# Patient Record
Sex: FEMALE | Race: WHITE | NOT HISPANIC OR LATINO | URBAN - METROPOLITAN AREA
[De-identification: names, ages, dates, MRNs, and addresses within clinical notes are randomized per-mention and may not be internally consistent; named-entity substitution may affect disease eponyms.]

---

## 2018-07-29 ENCOUNTER — EMERGENCY (EMERGENCY)
Facility: HOSPITAL | Age: 38
LOS: 1 days | Discharge: HOME | End: 2018-07-29
Attending: EMERGENCY MEDICINE | Admitting: EMERGENCY MEDICINE
Payer: MEDICAID

## 2018-07-29 VITALS
RESPIRATION RATE: 16 BRPM | SYSTOLIC BLOOD PRESSURE: 129 MMHG | HEART RATE: 108 BPM | WEIGHT: 134.92 LBS | OXYGEN SATURATION: 100 % | TEMPERATURE: 99 F | DIASTOLIC BLOOD PRESSURE: 79 MMHG
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
## 2018-07-29 DIAGNOSIS — S60.221A CONTUSION OF RIGHT HAND, INITIAL ENCOUNTER: ICD-10-CM
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
## 2018-07-29 DIAGNOSIS — M79.641 PAIN IN RIGHT HAND: ICD-10-CM
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
## 2018-07-29 PROCEDURE — 73130 X-RAY EXAM OF HAND: CPT | Mod: 26,RT

## 2018-07-29 PROCEDURE — 99283 EMERGENCY DEPT VISIT LOW MDM: CPT

## 2018-07-29 NOTE — ED PROVIDER NOTE - OBJECTIVE STATEMENT
38 y.o F with a PMHx of ADHD (on Ritalin, WellButrin, medical marijuana) and sinusitis, R hand dominant presents to the ED c/o R hand pain after punching a wall x 1 month ago. Pt states she accidentally struck her hand in the same spot x 2 weeks ago and currently notes pain and bruising to the area. She notes associated weakness in hand, a limited ROM and spasms. Pt denies numbness and tingling. 38 y.o F with a PMHx of sinusitis, ADHD (on Ritalin, WellButrin, medical marijuana), R hand dominant presents to the ED c/o R hand pain after punching a wall x 1 month ago. Pt states she accidentally struck her hand in the same spot x 2 weeks ago and currently notes pain and bruising to the area. She notes associated weakness in hand, a limited ROM and spasms. Pt denies numbness and tingling.

## 2018-07-29 NOTE — ED PROVIDER NOTE - DIAGNOSTIC INTERPRETATION
Xray R hand 3 views: No soft tissue swelling. no acute fx or dislocation, joint space intact, no effusion noted.

## 2018-07-29 NOTE — ED PROVIDER NOTE - SKIN, MLM
Skin normal color for race, warm, dry and intact. No evidence of rash. Skin normal color for race, warm, dry. Mild ecchymosis to R hand.
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
## 2019-08-28 ENCOUNTER — EMERGENCY (EMERGENCY)
Facility: HOSPITAL | Age: 39
LOS: 1 days | Discharge: HOME | End: 2019-08-28
Admitting: EMERGENCY MEDICINE
Payer: SELF-PAY

## 2019-08-28 VITALS
RESPIRATION RATE: 18 BRPM | HEART RATE: 71 BPM | SYSTOLIC BLOOD PRESSURE: 119 MMHG | DIASTOLIC BLOOD PRESSURE: 77 MMHG | OXYGEN SATURATION: 100 %

## 2019-08-28 VITALS
DIASTOLIC BLOOD PRESSURE: 83 MMHG | RESPIRATION RATE: 18 BRPM | TEMPERATURE: 98 F | HEART RATE: 80 BPM | WEIGHT: 134.92 LBS | SYSTOLIC BLOOD PRESSURE: 130 MMHG | OXYGEN SATURATION: 100 %

## 2019-08-28 DIAGNOSIS — S20.211S CONTUSION OF RIGHT FRONT WALL OF THORAX, SEQUELA: ICD-10-CM

## 2019-08-28 DIAGNOSIS — R07.89 OTHER CHEST PAIN: ICD-10-CM
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
## 2019-08-28 PROBLEM — F90.9 ATTENTION-DEFICIT HYPERACTIVITY DISORDER, UNSPECIFIED TYPE: Chronic | Status: ACTIVE | Noted: 2018-07-29

## 2019-08-28 PROBLEM — J32.9 CHRONIC SINUSITIS, UNSPECIFIED: Chronic | Status: ACTIVE | Noted: 2018-07-29

## 2019-08-28 PROCEDURE — 99283 EMERGENCY DEPT VISIT LOW MDM: CPT

## 2019-08-28 PROCEDURE — 71101 X-RAY EXAM UNILAT RIBS/CHEST: CPT | Mod: 26,RT

## 2019-08-28 RX ORDER — CYCLOBENZAPRINE HYDROCHLORIDE 10 MG/1
10 TABLET, FILM COATED ORAL ONCE
Refills: 0 | Status: COMPLETED | OUTPATIENT
Start: 2019-08-28 | End: 2019-08-28

## 2019-08-28 RX ADMIN — CYCLOBENZAPRINE HYDROCHLORIDE 10 MILLIGRAM(S): 10 TABLET, FILM COATED ORAL at 14:28

## 2019-08-28 NOTE — ED PROVIDER NOTE - PATIENT PORTAL LINK FT
You can access the FollowMyHealth Patient Portal offered by [facility] by registering at the following website: [email or web address]. By joining [employer]’s FollowMyHealth portal, you will also be able to view your health information using other applications (apps) compatible with our system.

## 2019-08-28 NOTE — ED PROVIDER NOTE - MUSCULOSKELETAL, MLM
Tenderness to palpation over the right rib. No deformity, no ecchymosis, no open wounds. Speaking in full sentences. No head hematoma.
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
## 2019-08-28 NOTE — ED PROVIDER NOTE - SKIN, MLM
Scattered old and new areas of ecchymosis to the body. Healed self-mutilating scares, no new wounds.
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
## 2023-06-28 ENCOUNTER — EMERGENCY (EMERGENCY)
Facility: HOSPITAL | Age: 43
LOS: 0 days | Discharge: LEFT BEFORE DISCHARGE | End: 2023-06-28
Attending: EMERGENCY MEDICINE
Payer: MEDICAID

## 2023-06-28 VITALS
WEIGHT: 130.07 LBS | HEART RATE: 102 BPM | SYSTOLIC BLOOD PRESSURE: 132 MMHG | TEMPERATURE: 98 F | DIASTOLIC BLOOD PRESSURE: 92 MMHG | RESPIRATION RATE: 16 BRPM | HEIGHT: 65 IN | OXYGEN SATURATION: 95 %

## 2023-06-28 DIAGNOSIS — F90.9 ATTENTION-DEFICIT HYPERACTIVITY DISORDER, UNSPECIFIED TYPE: ICD-10-CM

## 2023-06-28 DIAGNOSIS — Z53.21 PROCEDURE AND TREATMENT NOT CARRIED OUT DUE TO PATIENT LEAVING PRIOR TO BEING SEEN BY HEALTH CARE PROVIDER: ICD-10-CM

## 2023-06-28 PROBLEM — F41.9 ANXIETY DISORDER, UNSPECIFIED: Chronic | Status: ACTIVE | Noted: 2019-08-30

## 2023-06-28 PROCEDURE — L9991: CPT

## 2023-06-28 PROCEDURE — 82962 GLUCOSE BLOOD TEST: CPT

## 2023-06-28 NOTE — ED ADULT NURSE REASSESSMENT NOTE - NS ED NURSE REASSESS COMMENT FT1
Pt. received from previous RN. Pt. not in slotted area. Pt. name called multiple times with no response. Did not see pt. on walking rounds. Pt. maria esther. [facility] notified.
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
